# Patient Record
Sex: MALE | Race: WHITE | NOT HISPANIC OR LATINO | Employment: FULL TIME | URBAN - METROPOLITAN AREA
[De-identification: names, ages, dates, MRNs, and addresses within clinical notes are randomized per-mention and may not be internally consistent; named-entity substitution may affect disease eponyms.]

---

## 2017-09-25 ENCOUNTER — OFFICE VISIT (OUTPATIENT)
Dept: URGENT CARE | Age: 42
End: 2017-09-25
Payer: COMMERCIAL

## 2017-09-25 PROCEDURE — S9083 URGENT CARE CENTER GLOBAL: HCPCS

## 2017-09-25 PROCEDURE — G0383 LEV 4 HOSP TYPE B ED VISIT: HCPCS

## 2019-08-16 ENCOUNTER — OFFICE VISIT (OUTPATIENT)
Dept: INTERNAL MEDICINE CLINIC | Facility: CLINIC | Age: 44
End: 2019-08-16
Payer: COMMERCIAL

## 2019-08-16 VITALS
WEIGHT: 244.2 LBS | HEART RATE: 76 BPM | DIASTOLIC BLOOD PRESSURE: 70 MMHG | TEMPERATURE: 97.8 F | SYSTOLIC BLOOD PRESSURE: 118 MMHG | HEIGHT: 71 IN | OXYGEN SATURATION: 98 % | BODY MASS INDEX: 34.19 KG/M2

## 2019-08-16 DIAGNOSIS — L81.9 DISCOLORATION OF SKIN OF MULTIPLE SITES: ICD-10-CM

## 2019-08-16 DIAGNOSIS — J32.9 RECURRENT SINUSITIS: Primary | ICD-10-CM

## 2019-08-16 DIAGNOSIS — K52.9 CHRONIC DIARRHEA: ICD-10-CM

## 2019-08-16 DIAGNOSIS — Z13.6 ENCOUNTER FOR LIPID SCREENING FOR CARDIOVASCULAR DISEASE: ICD-10-CM

## 2019-08-16 DIAGNOSIS — Z13.220 ENCOUNTER FOR LIPID SCREENING FOR CARDIOVASCULAR DISEASE: ICD-10-CM

## 2019-08-16 DIAGNOSIS — M25.571 CHRONIC PAIN OF RIGHT ANKLE: ICD-10-CM

## 2019-08-16 DIAGNOSIS — G89.29 CHRONIC PAIN OF RIGHT ANKLE: ICD-10-CM

## 2019-08-16 DIAGNOSIS — J30.2 SEASONAL ALLERGIES: ICD-10-CM

## 2019-08-16 PROCEDURE — 99215 OFFICE O/P EST HI 40 MIN: CPT | Performed by: INTERNAL MEDICINE

## 2019-08-16 PROCEDURE — 3008F BODY MASS INDEX DOCD: CPT | Performed by: INTERNAL MEDICINE

## 2019-08-16 RX ORDER — LORATADINE 10 MG/1
CAPSULE, LIQUID FILLED ORAL
COMMUNITY
End: 2020-02-04

## 2019-08-16 NOTE — PATIENT INSTRUCTIONS
1  Fasting blood work  2  ENT specialist  3  GI specialist  4  Right ankle x-ray and aircast for next 2-3 weeks  5  Fasting blood work  6  Dermatology  7   Return in 6-8 weeks for follow up

## 2019-08-16 NOTE — PROGRESS NOTES
Assessment/Plan:     Diagnoses and all orders for this visit:    Recurrent sinusitis  Comments:  off/on for years with hx of seasonal allergies -> pt requests ENT eval   advised to start flonase and allegra to treat ongoing congestion  Orders:  -     Ambulatory Referral to Otolaryngology; Future  -     CBC and differential    Seasonal allergies  -     Ambulatory Referral to Otolaryngology; Future  -     Comprehensive metabolic panel; Future  -     CBC and differential  -     Comprehensive metabolic panel    Chronic pain of right ankle  Comments:  right ankle x-ray and air cast & avoid vigorous exercise using foot/ankle, if not improving over next 2-3 weeks -> ortho(foot & ankle) eval  Orders:  -     XR ankle 3+ vw right; Future    Chronic diarrhea  Comments:  IBS vs IBD vs colitis?  pt requests GI eval, provided  offered trial of bentyl, pt declined and has already tried lactose free diet  precautions advised  Orders:  -     Ambulatory referral to Gastroenterology; Future  -     Comprehensive metabolic panel; Future  -     CBC and differential  -     Lipase; Future  -     Comprehensive metabolic panel  -     Lipase    Encounter for lipid screening for cardiovascular disease  -     Lipid Panel with Direct LDL reflex; Future  -     Lipid Panel with Direct LDL reflex    Discoloration of skin of multiple sites  Comments:  derm eval, ref provided  Orders:  -     Ambulatory referral to Dermatology; Future    Other orders  -     Loratadine (CLARITIN) 10 MG CAPS; Take by mouth          BMI Counseling: Body mass index is 34 06 kg/m²  Discussed the patient's BMI with him  The BMI is above average  BMI counseling and education was provided to the patient  Nutrition recommendations include reducing portion sizes  Exercise recommendations include exercising 3-5 times per week  Spent >40 mins with patient including >50% of time counseling/coordination of care  Subjective:      Patient ID: Beryl Dhaliwal is a 40 y  o  male     HPI     Here for follow up, last seen almost 3 yrs ago  Now is a tenured  at [employer]  Has multiple questions/concerns today which we addressed at time of appt  Heaven Graham has had recurrent sinus congestion and sinus infections over recent years  He is recovering from a sinus infection now & would like to see an ENT to address  He hasn't had BW in years and gained weight since last seeing me in 2016  He is a former smoker and wants to know if he is eligible for new lung cancer screening/low dose CT scan  He has had intermittent diarrhea for years, mostly after having a meal of any food  No abd pain, N/V/abd surgery in past   No fhx IBD and has occ abd cramping and only occ blood in stool when straining to move bowels  No work up for this in past     He had ankle injury some years ago(inversion injury, never had it checked by a provider -> sprain?) and did the same injury Twice this summer  He has cont'd ankle pain some weeks now after last inversion injury  He has been using a T25 home exercise program which requires frequent jumping  Lastly, he would like to see a dermatologist for a rash he has had on his neck for years, no itching or pain but rash is spreading  Past Medical History:   Diagnosis Date    Allergic      Vitals:    08/16/19 1030   BP: 118/70   BP Location: Left arm   Patient Position: Sitting   Cuff Size: Standard   Pulse: 76   Temp: 97 8 °F (36 6 °C)   SpO2: 98%   Weight: 111 kg (244 lb 3 2 oz)   Height: 5' 11" (1 803 m)     Body mass index is 34 06 kg/m²  Current Outpatient Medications:     Loratadine (CLARITIN) 10 MG CAPS, Take by mouth, Disp: , Rfl:   No Known Allergies      Review of Systems   Constitutional: Negative for fever  HENT: Negative for congestion  Eyes: Negative for visual disturbance  Respiratory: Negative for shortness of breath  Cardiovascular: Negative for chest pain     Gastrointestinal: Positive for diarrhea  Negative for abdominal pain, nausea and vomiting  Endocrine: Negative for polyuria  Genitourinary: Negative for dysuria  Musculoskeletal: Positive for arthralgias  Skin: Positive for rash  Allergic/Immunologic: Positive for environmental allergies  Neurological: Negative for headaches  Psychiatric/Behavioral: Negative for dysphoric mood  Objective:      /70 (BP Location: Left arm, Patient Position: Sitting, Cuff Size: Standard)   Pulse 76   Temp 97 8 °F (36 6 °C)   Ht 5' 11" (1 803 m)   Wt 111 kg (244 lb 3 2 oz)   SpO2 98%   BMI 34 06 kg/m²           Physical Exam   Constitutional: He appears well-developed and well-nourished  HENT:   Head: Normocephalic and atraumatic  Right Ear: Tympanic membrane normal    Left Ear: Tympanic membrane normal    Nose: Mucosal edema present  Mouth/Throat: No posterior oropharyngeal erythema  Eyes: Pupils are equal, round, and reactive to light  Cardiovascular: Normal rate and regular rhythm  No murmur heard  Pulmonary/Chest: Effort normal and breath sounds normal  He has no wheezes  He has no rales  Abdominal: Soft  Bowel sounds are normal  There is no tenderness  Musculoskeletal: He exhibits no edema  Right ankle with medial tenderness anterior to medial malleolus  No ankle swelling & lachman's neg for joint laxity   Lymphadenopathy:     He has no cervical adenopathy  Neurological: He is alert  Skin: Rash (with patchy pau redness along neck with areas of hypopigmentation) noted  Psychiatric: He has a normal mood and affect  His behavior is normal    Vitals reviewed

## 2020-03-11 ENCOUNTER — TELEPHONE (OUTPATIENT)
Dept: INTERNAL MEDICINE CLINIC | Facility: CLINIC | Age: 45
End: 2020-03-11

## 2020-03-11 NOTE — TELEPHONE ENCOUNTER
Pt's wife is 7 months pregnant and he was wondering if he needed another TDap shot  He got one when she was pregnant with their first child, he wondered if he needed another for their second  Please advise

## 2020-03-11 NOTE — TELEPHONE ENCOUNTER
Per his records, Elizabeth Hammonds had Tdap in 1/2016, it is good for 10 yrs    No need for add'l Tdap booster for Elizabeth Hammonds at this time

## 2020-04-07 ENCOUNTER — TELEMEDICINE (OUTPATIENT)
Dept: INTERNAL MEDICINE CLINIC | Facility: CLINIC | Age: 45
End: 2020-04-07
Payer: COMMERCIAL

## 2020-04-07 DIAGNOSIS — J06.9 UPPER RESPIRATORY TRACT INFECTION, UNSPECIFIED TYPE: Primary | ICD-10-CM

## 2020-04-07 DIAGNOSIS — J30.9 ALLERGIC RHINITIS, UNSPECIFIED SEASONALITY, UNSPECIFIED TRIGGER: ICD-10-CM

## 2020-04-07 PROCEDURE — 99213 OFFICE O/P EST LOW 20 MIN: CPT | Performed by: INTERNAL MEDICINE

## 2020-04-07 RX ORDER — FLUTICASONE PROPIONATE 50 MCG
2 SPRAY, SUSPENSION (ML) NASAL DAILY
Qty: 1 BOTTLE | Refills: 1 | Status: SHIPPED | OUTPATIENT
Start: 2020-04-07 | End: 2021-06-14

## 2020-04-07 RX ORDER — FEXOFENADINE HCL 180 MG/1
180 TABLET ORAL DAILY
Qty: 90 TABLET | Refills: 1 | Status: SHIPPED | OUTPATIENT
Start: 2020-04-07 | End: 2021-06-14

## 2020-04-07 RX ORDER — AMOXICILLIN 500 MG/1
500 CAPSULE ORAL EVERY 8 HOURS SCHEDULED
Qty: 30 CAPSULE | Refills: 0 | Status: SHIPPED | OUTPATIENT
Start: 2020-04-07 | End: 2020-04-17

## 2020-04-09 DIAGNOSIS — J06.9 UPPER RESPIRATORY TRACT INFECTION, UNSPECIFIED TYPE: ICD-10-CM

## 2020-04-09 PROCEDURE — 87635 SARS-COV-2 COVID-19 AMP PRB: CPT

## 2020-04-10 LAB — SARS-COV-2 RNA SPEC QL NAA+PROBE: NOT DETECTED

## 2020-04-13 ENCOUNTER — TELEPHONE (OUTPATIENT)
Dept: INTERNAL MEDICINE CLINIC | Facility: CLINIC | Age: 45
End: 2020-04-13

## 2020-06-01 ENCOUNTER — OFFICE VISIT (OUTPATIENT)
Dept: INTERNAL MEDICINE CLINIC | Facility: CLINIC | Age: 45
End: 2020-06-01
Payer: COMMERCIAL

## 2020-06-01 VITALS
HEART RATE: 80 BPM | OXYGEN SATURATION: 97 % | BODY MASS INDEX: 39.25 KG/M2 | HEIGHT: 70 IN | WEIGHT: 274.2 LBS | SYSTOLIC BLOOD PRESSURE: 118 MMHG | RESPIRATION RATE: 18 BRPM | TEMPERATURE: 98 F | DIASTOLIC BLOOD PRESSURE: 74 MMHG

## 2020-06-01 DIAGNOSIS — M79.89 PAIN AND SWELLING OF TOE OF RIGHT FOOT: Primary | ICD-10-CM

## 2020-06-01 DIAGNOSIS — M79.674 PAIN AND SWELLING OF TOE OF RIGHT FOOT: Primary | ICD-10-CM

## 2020-06-01 DIAGNOSIS — Z13.220 ENCOUNTER FOR LIPID SCREENING FOR CARDIOVASCULAR DISEASE: ICD-10-CM

## 2020-06-01 DIAGNOSIS — Z13.6 ENCOUNTER FOR LIPID SCREENING FOR CARDIOVASCULAR DISEASE: ICD-10-CM

## 2020-06-01 DIAGNOSIS — J30.2 SEASONAL ALLERGIES: ICD-10-CM

## 2020-06-01 PROCEDURE — 1036F TOBACCO NON-USER: CPT | Performed by: INTERNAL MEDICINE

## 2020-06-01 PROCEDURE — 3008F BODY MASS INDEX DOCD: CPT | Performed by: INTERNAL MEDICINE

## 2020-06-01 PROCEDURE — 99213 OFFICE O/P EST LOW 20 MIN: CPT | Performed by: INTERNAL MEDICINE

## 2020-06-03 ENCOUNTER — HOSPITAL ENCOUNTER (OUTPATIENT)
Dept: RADIOLOGY | Facility: HOSPITAL | Age: 45
Discharge: HOME | End: 2020-06-03
Payer: COMMERCIAL

## 2020-06-03 DIAGNOSIS — M79.674 PAIN AND SWELLING OF TOE OF RIGHT FOOT: ICD-10-CM

## 2020-06-03 DIAGNOSIS — M79.89 PAIN AND SWELLING OF TOE OF RIGHT FOOT: ICD-10-CM

## 2020-06-03 PROCEDURE — 73660 X-RAY EXAM OF TOE(S): CPT

## 2020-06-05 ENCOUNTER — TELEPHONE (OUTPATIENT)
Dept: INTERNAL MEDICINE CLINIC | Facility: CLINIC | Age: 45
End: 2020-06-05

## 2020-06-05 DIAGNOSIS — S92.911A: Primary | ICD-10-CM

## 2020-08-05 ENCOUNTER — OFFICE VISIT (OUTPATIENT)
Dept: UROLOGY | Facility: MEDICAL CENTER | Age: 45
End: 2020-08-05
Payer: COMMERCIAL

## 2020-08-05 VITALS
DIASTOLIC BLOOD PRESSURE: 72 MMHG | BODY MASS INDEX: 38.36 KG/M2 | HEIGHT: 71 IN | TEMPERATURE: 97.1 F | SYSTOLIC BLOOD PRESSURE: 116 MMHG | WEIGHT: 274 LBS

## 2020-08-05 DIAGNOSIS — Z30.09 STERILIZATION CONSULT: ICD-10-CM

## 2020-08-05 DIAGNOSIS — Z30.09 STERILIZATION CONSULT: Primary | ICD-10-CM

## 2020-08-05 PROCEDURE — 99204 OFFICE O/P NEW MOD 45 MIN: CPT | Performed by: UROLOGY

## 2020-08-05 RX ORDER — ALPRAZOLAM 1 MG/1
TABLET ORAL
Qty: 2 TABLET | Refills: 0 | Status: SHIPPED | OUTPATIENT
Start: 2020-08-05 | End: 2020-11-03

## 2020-08-05 RX ORDER — ALPRAZOLAM 1 MG/1
TABLET ORAL
Qty: 2 TABLET | Refills: 0 | Status: SHIPPED | OUTPATIENT
Start: 2020-08-05 | End: 2020-08-05

## 2020-08-05 RX ORDER — HYDROCODONE BITARTRATE AND ACETAMINOPHEN 5; 325 MG/1; MG/1
TABLET ORAL
Qty: 10 TABLET | Refills: 0 | Status: SHIPPED | OUTPATIENT
Start: 2020-08-05 | End: 2020-11-03

## 2020-08-05 NOTE — TELEPHONE ENCOUNTER
Patient has an upcoming vasectomy procedure scheduled for 9/11/20 with Dr Edy Galo  Script DIRECTIONS and QUANTITY did not jive    CORRECTED SCRIPT was requeued and forwarded to the Advanced Practitioner covering the [facility] location for approval

## 2020-08-05 NOTE — PROGRESS NOTES
HISTORY:    Patient wants to schedule vasectomy  No prior  history         ASSESSMENT / PLAN:        This patient has  two children and would like to have a vasectomy  He and his wife or partner are sure they want no more children, and are aware that vasectomy is intended to be a permanent form of sterilization  He has been told and understands the following: We will order a semen analysis to check for sterility after three months, and that he must use protection during that time  No guarantee can be made of permanent sterility, and that failure of the procedure is not common, but can occur  Furthermore, spontaneous reestablishment of the flow sperm is quite rare but is a possible reason for failure of the procedure  Although it is not mandatory, if he wants to request another semen sample at any time in the future, to ensure continued sterility, he can do so, at his decision  Potential complications of the procedure include, but are not limited to:  Excessive bleeding which can cause significant swelling; infections; chronic lingering pain of the testicle; damage to the blood supply of the testicle, which might lead to testicular atrophy  The patient has told me he understands the above statements, and wishes to proceed with scheduling the vasectomy  The following portions of the patient's history were reviewed and updated as appropriate: allergies, current medications, past family history, past medical history, past social history, past surgical history and problem list     Review of Systems   All other systems reviewed and are negative  Objective:     Physical Exam   Constitutional: He is oriented to person, place, and time  He appears well-developed  No distress  HENT:   Head: Normocephalic and atraumatic  Eyes: No scleral icterus     Pulmonary/Chest: Effort normal    Genitourinary:    Genitourinary Comments:  Penis testes normal Neurological: He is alert and oriented to person, place, and time  Skin: He is not diaphoretic  No pallor  Psychiatric: His behavior is normal  Judgment and thought content normal          No results found for: PSA]  No results found for: BUN  No results found for: CREATININE  No components found for: CBC      Patient Active Problem List   Diagnosis    Seasonal allergies    Sterilization consult        Diagnoses and all orders for this visit:    Sterilization consult  -     HYDROcodone-acetaminophen (NORCO) 5-325 mg per tablet; 1-2 tab every 6 hr if needed for pain  -     ALPRAZolam (XANAX) 1 mg tablet; 1-2 hr before procedure           Patient ID: Hanane Carnes is a 39 y o  male        Current Outpatient Medications:     ALPRAZolam (XANAX) 1 mg tablet, 1-2 hr before procedure, Disp: 2 tablet, Rfl: 0    azelastine (ASTELIN) 0 1 % nasal spray, 2 sprays into each nostril 2 (two) times a day as needed for rhinitis or allergies Use in each nostril as directed (Patient not taking: Reported on 6/1/2020), Disp: 1 Bottle, Rfl: 5    fexofenadine (ALLEGRA) 180 MG tablet, Take 1 tablet (180 mg total) by mouth daily (Patient not taking: Reported on 6/1/2020), Disp: 90 tablet, Rfl: 1    fluticasone (FLONASE) 50 mcg/act nasal spray, 2 sprays into each nostril daily (Patient not taking: Reported on 6/1/2020), Disp: 1 Bottle, Rfl: 1    HYDROcodone-acetaminophen (NORCO) 5-325 mg per tablet, 1-2 tab every 6 hr if needed for pain, Disp: 10 tablet, Rfl: 0    Past Medical History:   Diagnosis Date    Allergic        Past Surgical History:   Procedure Laterality Date    KNEE ARTHROSCOPY W/ MEDIAL COLLATERAL LIGAMENT (MCL) REPAIR         Social History

## 2020-09-10 ENCOUNTER — TELEPHONE (OUTPATIENT)
Dept: UROLOGY | Facility: MEDICAL CENTER | Age: 45
End: 2020-09-10

## 2020-09-10 NOTE — TELEPHONE ENCOUNTER
This is a patient of Dr Cr Ramos in [facility]  Patient called and said his insurance company [employer] said they did not receive a referral for the procedure tomorrow  He said they said they only received it for office visit  Please call patient back at [phone]

## 2020-09-10 NOTE — TELEPHONE ENCOUNTER
Please get a new referral for this patient for his vas he doesn't need an auth but if CPT code not on referral guess it will not cover it  CPT [id number]    Thanks  Aditya Lopez

## 2020-09-10 NOTE — TELEPHONE ENCOUNTER
Called primary per primary consult and treat should be sufficient for vas  Per primary they have not heard of adding procedure code to referral but willing to try and issue a new referral with [phone] for patient  Will fax once done  Called patient and explained will call primary to issue a referral for procedure

## 2020-09-11 ENCOUNTER — PROCEDURE VISIT (OUTPATIENT)
Dept: UROLOGY | Facility: MEDICAL CENTER | Age: 45
End: 2020-09-11
Payer: COMMERCIAL

## 2020-09-11 VITALS
SYSTOLIC BLOOD PRESSURE: 118 MMHG | BODY MASS INDEX: 38.36 KG/M2 | HEIGHT: 71 IN | WEIGHT: 274 LBS | DIASTOLIC BLOOD PRESSURE: 72 MMHG | TEMPERATURE: 97.2 F

## 2020-09-11 DIAGNOSIS — Z30.2 ENCOUNTER FOR VASECTOMY: Primary | ICD-10-CM

## 2020-09-11 PROCEDURE — 88302 TISSUE EXAM BY PATHOLOGIST: CPT | Performed by: PATHOLOGY

## 2020-09-11 PROCEDURE — 55250 REMOVAL OF SPERM DUCT(S): CPT | Performed by: UROLOGY

## 2020-09-11 NOTE — LETTER
September 11, 2020     Bello Rojas DO  [address]    Patient: Rosa Hinton   YOB: 1975   Date of Visit: 9/11/2020       Dear Dr Yessi Ford:    Thank you for referring Rosa Hinton to me for evaluation  Below are my notes for this consultation  If you have questions, please do not hesitate to call me  I look forward to following your patient along with you  Sincerely,        Nancy Holguin MD        CC: No Recipients  Nancy Holguin MD  9/11/2020  8:02 AM  Sign when Signing Visit      Patient presents for vasectomy  He had a consultation recently, all questions were answered, potential complications of the procedure were discussed  Patient expressed understanding of everything we talked about, and signed informed consent document  Before the procedure today,  I asked the patient if he  had any further questions that were not answered during the vasectomy consult  He had no questions and gave informed consent for the procedure  The patient was placed in lithotomy position and the scrotum was prepped with betadine solution  I ensured he was comfortable in lithotomy position, and sterile drapes were placed  The left scrotum was palpated and vas identified  The left vas was isolated, and 8-10 cc of 1% lidocaine was instilled in the skin above the vas, in the dartos muscle and the tissue around the vas  I checked for sensation and the left side was properly anesthetized  A small incision was made over the vas, and the tissue surrounding the vas was gently dissected with hemostat to further isolate the vas  An allis clamp was placed around the vas, and it was brought up into the incision  A small incision in the tissue immediately surrounding the vas was made,and  a small loop of vas was  from the surrounding tissue with a hemostat  A small segment of vas was removed with scissors    The inner lumen of each end of the cut vas was cauterized with bovie to scar the lumen  Each end of the vas was sealed with a hemoclip, taking care not to place the clip too hard, to prevent the clipped end from sloughing off  I checked for bleeding very carefully, used gentle bovie cautery as needed  I was careful not to disturb the arterial supply to the vas or the testicle  Inspection showed no significant bleeding  The vas and surrounding tissue were gently placed back into the scrotum  The right sided vasectomy was performed in identical fashion as in the above paragraph  After removing the segment of vas, cauterizing the lumen, clipping the ends, I checked carefully for bleeding, and there was none  Each incision was closed with a running suture of 4-0 chromic  There was no significant skin bleeding  A sterile dressing was applied and the patient's underwear held the bandage snugly  He was carefully raised to a sitting position and observed to ensure no orthostasis or dizziness  He was properly recovered from the procedure  I reviewed the post-op instructions again and gave him a copy of the instructions in writing  I again emphasized the importance of using birth control until a negative semen sample was obtained at 3 months  Before that he cannot  be considered infertile  He knows his partner must use birth control until that negative sample  He also knows there are rare cases of spontaneous reversal of the vasectomy, and absolutely no guarantee can be made of lifelong infertility  He knows that he can request another semen sample at anytime in the future if he wants further reassurance, although it is not necessary  He was escorted to the waiting room, and his  knew to take him right home  He will be seen in 1-2 wks for a post-op visit  Before he left the procedure room I reviewed all the post-op instructions with him, and his said he understood them       He knows to keep the bandage on until the next day, and he can shower then  He knows to limit activities for several days and call us immediately if he has any concerns for bleeding, pain, infection, or any complication  The patient tolerated the procedure well and understood all instructions and exited the exam room in good condition

## 2020-09-18 ENCOUNTER — TELEPHONE (OUTPATIENT)
Dept: UROLOGY | Facility: MEDICAL CENTER | Age: 45
End: 2020-09-18

## 2020-09-18 NOTE — TELEPHONE ENCOUNTER
Patient seen by Dr Shelley Martin at [facility]    Patient had VAS on 9/11  VAS follow up was never set up  Cannot find an appointment with AP at [facility] or [facility] to schedule patient      Please assist     He can be reached at [phone]

## 2020-09-21 NOTE — TELEPHONE ENCOUNTER
Called and left detailed message for patient to call back to schedule an f/u   Dr Rudd Points can be double booked 9/25 at either 10 am or 3 pm

## 2020-09-25 ENCOUNTER — OFFICE VISIT (OUTPATIENT)
Dept: UROLOGY | Facility: MEDICAL CENTER | Age: 45
End: 2020-09-25

## 2020-09-25 VITALS
TEMPERATURE: 98.5 F | DIASTOLIC BLOOD PRESSURE: 74 MMHG | HEIGHT: 71 IN | SYSTOLIC BLOOD PRESSURE: 122 MMHG | WEIGHT: 272 LBS | BODY MASS INDEX: 38.08 KG/M2

## 2020-09-25 DIAGNOSIS — Z30.09 STERILIZATION CONSULT: Primary | ICD-10-CM

## 2020-09-25 PROCEDURE — 99024 POSTOP FOLLOW-UP VISIT: CPT | Performed by: UROLOGY

## 2020-11-03 ENCOUNTER — OFFICE VISIT (OUTPATIENT)
Dept: INTERNAL MEDICINE CLINIC | Facility: CLINIC | Age: 45
End: 2020-11-03
Payer: COMMERCIAL

## 2020-11-03 VITALS
RESPIRATION RATE: 18 BRPM | BODY MASS INDEX: 37.35 KG/M2 | HEIGHT: 71 IN | WEIGHT: 266.8 LBS | HEART RATE: 83 BPM | TEMPERATURE: 98.2 F | DIASTOLIC BLOOD PRESSURE: 76 MMHG | SYSTOLIC BLOOD PRESSURE: 122 MMHG | OXYGEN SATURATION: 97 %

## 2020-11-03 DIAGNOSIS — J30.2 SEASONAL ALLERGIES: ICD-10-CM

## 2020-11-03 DIAGNOSIS — Z23 FLU VACCINE NEED: ICD-10-CM

## 2020-11-03 DIAGNOSIS — Z00.00 WELLNESS EXAMINATION: Primary | ICD-10-CM

## 2020-11-03 PROCEDURE — 99396 PREV VISIT EST AGE 40-64: CPT | Performed by: INTERNAL MEDICINE

## 2020-11-03 PROCEDURE — 90471 IMMUNIZATION ADMIN: CPT

## 2020-11-03 PROCEDURE — 90686 IIV4 VACC NO PRSV 0.5 ML IM: CPT

## 2021-04-09 DIAGNOSIS — Z23 ENCOUNTER FOR IMMUNIZATION: ICD-10-CM

## 2021-06-14 ENCOUNTER — OFFICE VISIT (OUTPATIENT)
Dept: INTERNAL MEDICINE CLINIC | Facility: CLINIC | Age: 46
End: 2021-06-14
Payer: COMMERCIAL

## 2021-06-14 VITALS
BODY MASS INDEX: 38.44 KG/M2 | DIASTOLIC BLOOD PRESSURE: 75 MMHG | HEIGHT: 71 IN | HEART RATE: 78 BPM | TEMPERATURE: 98.1 F | SYSTOLIC BLOOD PRESSURE: 110 MMHG | WEIGHT: 274.6 LBS | RESPIRATION RATE: 16 BRPM | OXYGEN SATURATION: 96 %

## 2021-06-14 DIAGNOSIS — M62.830 LUMBAR PARASPINAL MUSCLE SPASM: Primary | ICD-10-CM

## 2021-06-14 DIAGNOSIS — R19.7 INTERMITTENT DIARRHEA: ICD-10-CM

## 2021-06-14 DIAGNOSIS — R21 LOCALIZED RASH: ICD-10-CM

## 2021-06-14 DIAGNOSIS — Z13.6 ENCOUNTER FOR LIPID SCREENING FOR CARDIOVASCULAR DISEASE: ICD-10-CM

## 2021-06-14 DIAGNOSIS — Z13.220 ENCOUNTER FOR LIPID SCREENING FOR CARDIOVASCULAR DISEASE: ICD-10-CM

## 2021-06-14 DIAGNOSIS — M77.02 MEDIAL EPICONDYLITIS OF ELBOW, LEFT: ICD-10-CM

## 2021-06-14 PROCEDURE — 99214 OFFICE O/P EST MOD 30 MIN: CPT | Performed by: INTERNAL MEDICINE

## 2021-06-14 RX ORDER — METHOCARBAMOL 500 MG/1
500 TABLET, FILM COATED ORAL EVERY 12 HOURS PRN
Qty: 15 TABLET | Refills: 0 | Status: SHIPPED | OUTPATIENT
Start: 2021-06-14

## 2021-06-14 NOTE — PROGRESS NOTES
Assessment/Plan:     Diagnoses and all orders for this visit:    Lumbar paraspinal muscle spasm  Comments:  trial of hydration, rest and robaxin  d/w patient AE of med including drowsiness  f/u if not improved for imaging +/- PT  Orders:  -     Comprehensive metabolic panel; Future  -     Comprehensive metabolic panel  -     methocarbamol (ROBAXIN) 500 mg tablet; Take 1 tablet (500 mg total) by mouth every 12 (twelve) hours as needed for muscle spasms    Medial epicondylitis of elbow, left  Comments:  elbow brace(for tennis/golfer's elbow) daily for 6-8 weeks  advised to call if not improving  Orders:  -     Comprehensive metabolic panel; Future  -     Comprehensive metabolic panel  -     Elbow Rom Brace    Localized rash  Comments:  improved with emollients and as seasons changed(as does every year)  c/w emollients & t/c derm eval  Orders:  -     Comprehensive metabolic panel; Future  -     Comprehensive metabolic panel    Intermittent diarrhea  Comments:  advised on trial of FODMAP diet(handout provided to patient)  emphasized importance of seeing GI for eval/proper testing    Encounter for lipid screening for cardiovascular disease  -     Lipid Panel with Direct LDL reflex; Future  -     Lipid Panel with Direct LDL reflex    Other orders  -     Cancel: XR elbow 2 vw left; Future      BMI Counseling: Body mass index is 38 3 kg/m²  The BMI is above normal  Exercise recommendations include exercising 3-5 times per week  Subjective:      Patient ID: Vera Chacon is a 39 y o  male  HPI    Here for follow up, [facility] is overall stable but has a few concerns  He did not complete his BW yet  Every winter, he develops a red rash around his eyes that is not painful or itchy  He has no eye involvement or vision changes  Rash improved when he used vitamin A & d ointment but usually goes away after January every year  Also c/o medlal left elbow pain recently  No fall/injury or elbow swelling    Good ROM of left arm/elbow  Jamshid Puri was building a fence around his vegetable garden and may have pulled his back a couple of weeks ago  He did not fall but is still having low back spasms since then  He has intermittent diarrhea related to certain foods  He does not have N/V/abd pain or change in appetite  His wife wants him to see a GI doctor for this which is reasonable  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  He stopped exercising for sometime but has since resumed lately including running  ROS Otherwise negative, no other complaints  Past Medical History:   Diagnosis Date    Allergic      Vitals:    06/14/21 0924 06/14/21 0952   BP: 122/78 110/75   BP Location:  Left arm   Patient Position:  Sitting   Cuff Size:  Standard   Pulse: 78    Resp: 16    Temp: 98 1 °F (36 7 °C)    SpO2: 96%    Weight: 125 kg (274 lb 9 6 oz)    Height: 5' 11" (1 803 m)      Body mass index is 38 3 kg/m²  Current Outpatient Medications:     methocarbamol (ROBAXIN) 500 mg tablet, Take 1 tablet (500 mg total) by mouth every 12 (twelve) hours as needed for muscle spasms, Disp: 15 tablet, Rfl: 0  No Known Allergies      Review of Systems   Constitutional: Negative for fever  HENT: Negative for congestion  Eyes: Negative for visual disturbance  Respiratory: Negative for shortness of breath  Cardiovascular: Negative for chest pain  Gastrointestinal: Negative for abdominal pain  Endocrine: Negative for polyuria  Genitourinary: Negative for difficulty urinating  Musculoskeletal: Positive for arthralgias, back pain and myalgias  Skin: Positive for rash (but since resolved)  Allergic/Immunologic: Negative for immunocompromised state  Neurological: Negative for weakness  Psychiatric/Behavioral: Negative for dysphoric mood           Objective:      /75 (BP Location: Left arm, Patient Position: Sitting, Cuff Size: Standard)   Pulse 78   Temp 98 1 °F (36 7 °C)   Resp 16   Ht 5' 11" (1 803 m)   Wt 125 kg (274 lb 9 6 oz)   SpO2 96%   BMI 38 30 kg/m²          Physical Exam  Vitals reviewed  Constitutional:       Appearance: Normal appearance  He is obese  HENT:      Head: Normocephalic and atraumatic  Right Ear: Tympanic membrane normal       Left Ear: Tympanic membrane normal    Cardiovascular:      Rate and Rhythm: Normal rate and regular rhythm  Heart sounds: No murmur heard  Pulmonary:      Effort: Pulmonary effort is normal       Breath sounds: No wheezing or rales  Abdominal:      General: Bowel sounds are normal       Palpations: Abdomen is soft  Tenderness: There is no abdominal tenderness  Musculoskeletal:      Left elbow: No effusion  Normal range of motion  No tenderness  Back:       Right lower leg: No edema  Left lower leg: No edema  Comments: Pain with resisted supination of left forearm along medial epicondyle  No spinal or paraspinal tenderness b/l   Skin:     Findings: No rash (no rash around eyes at this time)  Neurological:      Mental Status: He is alert     Psychiatric:         Mood and Affect: Mood normal          Behavior: Behavior normal

## 2022-06-08 ENCOUNTER — TELEPHONE (OUTPATIENT)
Dept: OTHER | Facility: OTHER | Age: 47
End: 2022-06-08

## 2022-06-08 DIAGNOSIS — Z30.2 ENCOUNTER FOR VASECTOMY: Primary | ICD-10-CM

## 2022-06-08 NOTE — TELEPHONE ENCOUNTER
Patient calling in stating that he was suppose to have a semen analysis post vasectomy awhile back and he did not make it to the lab to get it done  He would like to know if he could get another order for the semen analysis  Please follow up with patient once his order is placed so he can go to the lab to have it completed

## 2022-06-13 ENCOUNTER — OFFICE VISIT (OUTPATIENT)
Dept: LAB | Facility: HOSPITAL | Age: 47
End: 2022-06-13
Attending: UROLOGY
Payer: COMMERCIAL

## 2022-06-13 DIAGNOSIS — Z30.2 STERILIZATION: Primary | ICD-10-CM

## 2022-06-13 LAB
DEPRECATED CD4 CELLS/CD8 CELLS BLD: 1 ML
SPERM MOTILE SMN QL MICRO: NORMAL

## 2022-06-13 PROCEDURE — 89321 SEMEN ANAL SPERM DETECTION: CPT

## 2022-11-15 ENCOUNTER — OFFICE VISIT (OUTPATIENT)
Dept: INTERNAL MEDICINE CLINIC | Facility: CLINIC | Age: 47
End: 2022-11-15

## 2022-11-15 VITALS
RESPIRATION RATE: 16 BRPM | WEIGHT: 283 LBS | TEMPERATURE: 97 F | SYSTOLIC BLOOD PRESSURE: 120 MMHG | HEART RATE: 73 BPM | OXYGEN SATURATION: 98 % | HEIGHT: 71 IN | BODY MASS INDEX: 39.62 KG/M2 | DIASTOLIC BLOOD PRESSURE: 78 MMHG

## 2022-11-15 DIAGNOSIS — L85.3 DRY SKIN DERMATITIS: ICD-10-CM

## 2022-11-15 DIAGNOSIS — Z11.59 NEED FOR HEPATITIS C SCREENING TEST: ICD-10-CM

## 2022-11-15 DIAGNOSIS — Z00.00 WELLNESS EXAMINATION: Primary | ICD-10-CM

## 2022-11-15 DIAGNOSIS — Z23 FLU VACCINE NEED: ICD-10-CM

## 2022-11-15 DIAGNOSIS — E66.9 CLASS 2 OBESITY WITHOUT SERIOUS COMORBIDITY WITH BODY MASS INDEX (BMI) OF 39.0 TO 39.9 IN ADULT, UNSPECIFIED OBESITY TYPE: ICD-10-CM

## 2022-11-15 DIAGNOSIS — Z12.11 SCREEN FOR COLON CANCER: ICD-10-CM

## 2022-11-15 DIAGNOSIS — Z13.220 ENCOUNTER FOR LIPID SCREENING FOR CARDIOVASCULAR DISEASE: ICD-10-CM

## 2022-11-15 DIAGNOSIS — Z13.6 ENCOUNTER FOR LIPID SCREENING FOR CARDIOVASCULAR DISEASE: ICD-10-CM

## 2022-11-15 NOTE — PROGRESS NOTES
Name: Mary Montelongo      : 1975      MRN: [id number]  Encounter Provider: Andreas Hudson DO  Encounter Date: 11/15/2022   Encounter department: [place] [address]     1  Wellness examination    2  Dry skin dermatitis  Comments:  eucerin, cerave or aquaphor daily for dryness    3  Class 2 obesity without serious comorbidity with body mass index (BMI) of 39 0 to 39 9 in adult, unspecified obesity type  Comments:  diet/exercise counseling discussed  information provided on saxenda and wegovy for his review  Orders:  -     Comprehensive metabolic panel; Future  -     Comprehensive metabolic panel    4  Encounter for lipid screening for cardiovascular disease  -     Lipid Panel with Direct LDL reflex; Future  -     Lipid Panel with Direct LDL reflex    5  Need for hepatitis C screening test  -     Hepatitis C antibody; Future  -     Hepatitis C antibody    6  Flu vaccine need  -     influenza vaccine, quadrivalent, recombinant, PF, 0 5 mL, for patients 18 yr+ (FLUBLOK)    7  Screen for colon cancer  Comments:  no symptoms, average risk  Orders:  -     Ambulatory referral for colonoscopy; Future      BMI Counseling: Body mass index is 39 47 kg/m²  The BMI is above normal  Nutrition recommendations include consuming healthier snacks  Exercise recommendations include exercising 3-5 times per week  Rationale for BMI follow-up plan is due to patient being overweight or obese  Depression Screening and Follow-up Plan: Patient was screened for depression during today's encounter  They screened negative with a PHQ-2 score of 0  Subjective      HPI     Here for physical, Ricardo Chau takes no medications on  Regular basis  He requests flu vaccine today  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[employer]  He has limited time out of work and taking care of family to exercise    He recently gained weight as he had a knee problem and now has right foot pain for which he is seeing podiatry for  He has not had BW in some years and never had colonoscopy in the past   ROS Otherwise negative, no other complaints  Review of Systems   Constitutional: Negative for fever  HENT: Negative for congestion  Eyes: Negative for visual disturbance  Respiratory: Negative for shortness of breath  Cardiovascular: Negative for chest pain  Gastrointestinal: Negative for abdominal pain  Endocrine: Negative for polyuria  Genitourinary: Negative for difficulty urinating  Musculoskeletal: Positive for arthralgias  Skin: Negative for rash  Allergic/Immunologic: Negative for immunocompromised state  Neurological: Negative for dizziness  Psychiatric/Behavioral: Negative for dysphoric mood  Current Outpatient Medications on File Prior to Visit   Medication Sig   • [DISCONTINUED] methocarbamol (ROBAXIN) 500 mg tablet Take 1 tablet (500 mg total) by mouth every 12 (twelve) hours as needed for muscle spasms       Objective     /78 (BP Location: Left arm, Patient Position: Sitting, Cuff Size: Large)   Pulse 73   Temp (!) 97 °F (36 1 °C) (Tympanic)   Resp 16   Ht 5' 11" (1 803 m)   Wt 128 kg (283 lb)   SpO2 98%   BMI 39 47 kg/m²     Physical Exam  Vitals reviewed  Constitutional:       Appearance: Normal appearance  He is obese  Interventions: Face mask in place  HENT:      Head: Normocephalic and atraumatic  Right Ear: Tympanic membrane normal       Left Ear: Tympanic membrane normal    Eyes:      Conjunctiva/sclera: Conjunctivae normal    Cardiovascular:      Rate and Rhythm: Normal rate and regular rhythm  Heart sounds: No murmur heard  No gallop  Pulmonary:      Effort: Pulmonary effort is normal       Breath sounds: No wheezing or rales  Abdominal:      General: Bowel sounds are normal       Palpations: Abdomen is soft  Tenderness: There is no abdominal tenderness     Musculoskeletal:      Right lower leg: Edema (trace since having right knee injury but improving/resolving) present  Left lower leg: No edema  Skin:     General: Skin is dry  Comments: Dry skin on lower legs   Neurological:      Mental Status: He is alert  Mental status is at baseline     Psychiatric:         Mood and Affect: Mood normal          Behavior: Behavior normal        Bello Rojas DO

## 2022-11-15 NOTE — PATIENT INSTRUCTIONS
Flu vaccine today  Fasting blood work  Colonoscopy  Eucerin, aquaphor or Cerave    Liraglutide (By injection)   Liraglutide (xef-d-CHMU-tide)  Treats type 2 diabetes and helps with weight loss in certain patients  Also reduces the risk of heart attacks and strokes in patients with type 2 diabetes and heart or blood vessel disease  Brand Name(s): Saxenda, Victoza   There may be other brand names for this medicine  When This Medicine Should Not Be Used: This medicine is not right for everyone  Do not use it if you had an allergic reaction to liraglutide, or if you have type 1 diabetes or multiple endocrine neoplasia syndrome type 2 (MEN 2) or if you or anyone in your family had medullary thyroid cancer  Tell your doctor if you are pregnant or have become pregnant while you are using this medicine  How to Use This Medicine:   Injectable  Your doctor will prescribe your exact dose and tell you how often it should be given  This medicine is given as a shot under your skin  It is usually given in your stomach, thighs, or upper arms  If you use insulin in addition to Victoza®, do not mix them into the same syringe  You may give the shots in the same area (including your stomach), but do not give the shots right next to each other  You may be taught how to give your medicine at home  Make sure you understand all instructions before giving yourself an injection  Do not use more medicine or use it more often than your doctor tells you to  Check the liquid in the pen  It should be clear and colorless  Do not use it if it is cloudy, discolored, or has particles in it  You will be shown the body areas where this shot can be given  Use a different body area each time you give yourself a shot  Keep track of where you give each shot to make sure you rotate body areas  Use a new needle and syringe each time you inject your medicine  Never share medicine pens with others under any circumstances   Sharing needles or pens can result in transmission of infection  Drink extra fluids so you will urinate more often and help prevent kidney problems  This medicine should come with a Medication Guide  Ask your pharmacist for a copy if you do not have one  Missed dose: If you miss a dose of this medicine, skip the missed dose and go back to your regular dosing schedule  Never take extra medicine to make up for a missed dose  If you miss a dose for 3 days or more, call your doctor to talk about how to restart your treatment  Store your new, unused medicine pen in its original carton in the refrigerator  Protect it from light  Do not freeze this medicine or use it if it has been frozen  You may store the opened medicine pen in the refrigerator or at room temperature for 30 days  Throw away any unused medicine after 30 days, even if it still has medicine in it  Always remove the needle from the pen before you store it  Throw away used needles in a hard, closed container that the needles cannot poke through  Keep this container away from children and pets  Drugs and Foods to Avoid:   Ask your doctor or pharmacist before using any other medicine, including over-the-counter medicines, vitamins, and herbal products  Do not use Saxenda® together with insulin  Some medicines can affect how Claudia Gibbsf  works  Tell your doctor if you are using insulin or other diabetes medicine (including ? ?glibenclamide, glimepiride, glipizide) or any other oral medicine  Warnings While Using This Medicine:   Tell your doctor if you are breastfeeding, or if you have kidney disease, liver disease, digestion problems (including gastroparesis), gallbladder disease, or a history of pancreas problems, depression, or angioedema (swelling of the arms, face, hands, mouth, or throat)  Do not use Saxenda® if you are also using Victoza®  They contain the same medicine    This medicine may cause the following problems:   Increased risk for thyroid tumors  Pancreatitis (swelling of the pancreas)  Low blood sugar (when used together with insulin or other diabetes medicine)  Kidney problems  Gallbladder problems, including gallstones  Thoughts of hurting yourself Eloise Jones)  Your doctor will do lab tests at regular visits to check on the effects of this medicine  Keep all appointments  Keep all medicine out of the reach of children  Never share your medicine with anyone  Possible Side Effects While Using This Medicine:   Call your doctor right away if you notice any of these side effects: Allergic reaction: Itching or hives, swelling in your face or hands, swelling or tingling in your mouth or throat, chest tightness, trouble breathing  Change in how much or how often you urinate, painful or burning urination  Feeling sad or depressed, thoughts of suicide, unusual changes in mood or behavior  Shaking, trembling, sweating, fast or pounding heartbeat, fainting, hunger, confusion  Sudden and severe stomach pain, nausea, vomiting, fever, lightheadedness, yellow skin or eyes  Trouble breathing or swallowing, a lump in your neck, hoarseness when speaking  If you notice these less serious side effects, talk with your doctor:   Decreased appetite  Diarrhea, constipation, stomach upset  Dizziness  Headache  Redness, itching, swelling, or any changes in your skin where the shot was given  If you notice other side effects that you think are caused by this medicine, tell your doctor  Call your doctor for medical advice about side effects  You may report side effects to FDA at 0-941-FDA-0722    © Copyright [employer] 2022 Information is for End User's use only and may not be sold, redistributed or otherwise used for commercial purposes  The above information is an  only  It is not intended as medical advice for individual conditions or treatments   Talk to your doctor, nurse or pharmacist before following any medical regimen to see if it is safe and effective for you       Semaglutide (By injection)   Semaglutide (wzv-b-LVTE-tide)  Treats type 2 diabetes  Lowers the risk of heart attack, stroke, or death in patients with type 2 diabetes and heart or blood vessel disease  Also used to help lose weight and keep the weight off in patients with obesity caused by certain conditions  Brand Name(s): Ozempic 0 25 MG or 0 5 MG Doses, Ozempic 1 MG Doses, Wegovy   There may be other brand names for this medicine  When This Medicine Should Not Be Used: This medicine is not right for everyone  Do not use it if you had an allergic reaction to semaglutide, or if you have multiple endocrine neoplasia syndrome type 2 (MEN 2) or if you or anyone in your family has had medullary thyroid cancer  How to Use This Medicine:   Injectable  Your doctor will prescribe your exact dose and tell you how often it should be given  This medicine is given as a shot under your skin  It is given into your stomach, thigh, or upper arm  You may be taught how to give your medicine at home  Make sure you understand all instructions before giving yourself an injection  Do not use more medicine or use it more often than your doctor tells you to  If you use insulin in addition to this medicine, do not mix them into the same syringe  You may give the shots in the same area (including your stomach), but do not give the shots right next to each other  Check the liquid in the pen  It should be clear and colorless  Do not use it if it is cloudy, discolored, or has particles in it  You will be shown the body areas where this shot can be given  Use a different body area each time you give yourself a shot  Keep track of where you give each shot to make sure you rotate body areas  Use a new needle and syringe each time you inject your medicine  Never share medicine pens with others under any circumstances  Sharing needles or pens can result in transmission of infection    This medicine should come with a Medication Guide  Ask your pharmacist for a copy if you do not have one  Missed dose:   Ozempic®: If you miss a dose of this medicine, use it as soon as possible within 5 days after the missed dose  If you miss a dose for more than 5 days, skip the missed dose and go back to your regular dosing schedule  Wegovy™: If you miss a dose, and the next scheduled dose is more than 2 days away, use it as soon as possible  If you miss a dos, and the next scheduled dose is less than 2 days away, skip the missed dose and go back to your regular dosing schedule  If you miss a dose of this medicine for more than 2 weeks, use it on the next scheduled dose  Ask your doctor about how to restart your treatment  Store your new, unused medicine pen in its original carton in the refrigerator  Do not freeze  You may store the opened Ozempic® pen in the refrigerator or at room temperature for 56 days or the opened [facility] pen in the refrigerator or at room temperature for 28 days  Throw away the pen after you use it for 56 days for Ozempic® or 28 days for [facility], even if it still has medicine in it  Drugs and Foods to Avoid:   Ask your doctor or pharmacist before using any other medicine, including over-the-counter medicines, vitamins, and herbal products  Some medicines may affect how semaglutide works  Tell your doctor if you are using other diabetes medicine (including glimepiride, glipizide, glyburide, or insulin) or any oral medicine  Warnings While Using This Medicine:   Tell your doctor if you are pregnant or planning to become pregnant  Do not use this medicine for at least 2 months before you plan to become pregnant  Tell your doctor if you are breastfeeding, or if you have kidney disease, pancreas problems, diabetes, digestion problems, or a history of diabetic retinopathy or depression    This medicine may cause the following problems:  Increased risk of thyroid tumor  Pancreatitis (swelling of the pancreas)  Gallbladder problems  Low blood sugar (when used with other diabetes medicine)  Kidney problems  Eye or vision problems, including diabetic retinopathy  Increased heart rate  Increased risk for depression or thoughts of suicide  Your doctor will do lab tests at regular visits to check on the effects of this medicine  Keep all appointments  Keep all medicine out of the reach of children  Never share your medicine with anyone  Possible Side Effects While Using This Medicine:   Call your doctor right away if you notice any of these side effects: Allergic reaction: Itching or hives, swelling in your face or hands, swelling or tingling in your mouth or throat, chest tightness, trouble breathing  Blurred vision or any other change in vision  Change in how much or how often you urinate, lower back or side pain, blood in your urine  Shaking, trembling, sweating, fast or pounding heartbeat, lightheadedness, hunger, confusion  Sudden and severe stomach pain, nausea, vomiting, fever, yellow eyes or skin  Unusual moods or behaviors, depression, thoughts of hurting yourself or others  If you notice these less serious side effects, talk with your doctor:   Constipation, diarrhea, passing gas, stomach upset or bloating  Headache, dizziness  Redness, itching, bump, swelling, or any changes in your skin where the shot was given  Tiredness  If you notice other side effects that you think are caused by this medicine, tell your doctor  Call your doctor for medical advice about side effects  You may report side effects to FDA at 0-432-FDA-0096    © Copyright [employer] 2022 Information is for End User's use only and may not be sold, redistributed or otherwise used for commercial purposes  The above information is an  only  It is not intended as medical advice for individual conditions or treatments   Talk to your doctor, nurse or pharmacist before following any medical regimen to see if it is safe and effective for you

## 2023-08-02 ENCOUNTER — TELEMEDICINE (OUTPATIENT)
Dept: INTERNAL MEDICINE CLINIC | Facility: CLINIC | Age: 48
End: 2023-08-02
Payer: COMMERCIAL

## 2023-08-02 ENCOUNTER — TELEPHONE (OUTPATIENT)
Dept: INTERNAL MEDICINE CLINIC | Facility: CLINIC | Age: 48
End: 2023-08-02

## 2023-08-02 DIAGNOSIS — J06.9 UPPER RESPIRATORY TRACT INFECTION, UNSPECIFIED TYPE: Primary | ICD-10-CM

## 2023-08-02 PROCEDURE — 99213 OFFICE O/P EST LOW 20 MIN: CPT | Performed by: INTERNAL MEDICINE

## 2023-08-02 RX ORDER — DOXYCYCLINE HYCLATE 100 MG/1
100 CAPSULE ORAL EVERY 12 HOURS SCHEDULED
Qty: 14 CAPSULE | Refills: 0 | Status: SHIPPED | OUTPATIENT
Start: 2023-08-02 | End: 2023-08-09

## 2023-08-02 NOTE — PROGRESS NOTES
Virtual Regular Visit    Verification of patient location:    Patient is located at Home in the following state in which I hold an active license [place]      Assessment/Plan:    Problem List Items Addressed This Visit    None  Visit Diagnoses     Upper respiratory tract infection, unspecified type    -  Primary    Relevant Medications    doxycycline hyclate (VIBRAMYCIN) 100 mg capsule        Disposition:    -URI -> will treat with doxycycline for 7 days. Advised on AE of abx(take with food and sunblock now that it is summer). 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 Reason for visit is   Chief Complaint   Patient presents with   • Virtual Regular Visit        Encounter provider Luis Benavides DO    Provider located at [address],[facility] [address]  [phone]      Recent Visits  No visits were found meeting these conditions. Showing recent visits within past 7 days and meeting all other requirements  Today's Visits  Date Type Provider Dept   08/02/23 Telemedicine Stefani Menchaca [facility] Med   08/02/23 Telephone Stefani Menchaca [facility]   Showing today's visits and meeting all other requirements  Future Appointments  No visits were found meeting these conditions. Showing future appointments within next 150 days and meeting all other requirements       The patient was identified by name and date of birth. Peter Aceves was informed that this is a telemedicine visit and that the visit is being conducted through the [employer]. He agrees to proceed. .  My office door was closed. No one else was in the room. He acknowledged consent and understanding of privacy and security of the video platform. The patient has agreed to participate and understands they can discontinue the visit at any time.     Patient is aware this is a billable service. Subjective  Dorina Oglesby is a 50 y.o. male for follow up, virtual(15) visit due to ongoing COVID-19 pandemic. HPI     Eunice Antony has had 3 weeks of URI symptoms. He had COVID-19 infection back in May and recovered fully from that. This episode started while on vacation. He had no Fever, chills, diarrhea or SOB. He is having a productive cough, some congestion and feeling unwell. He started taking OTC remedies about 1 week ago with limited benefit. He has no other questions or concerns today and ROS is otherwise negative. Past Medical History:   Diagnosis Date   • Allergic        Past Surgical History:   Procedure Laterality Date   • KNEE ARTHROSCOPY W/ MEDIAL COLLATERAL LIGAMENT (MCL) REPAIR         Current Outpatient Medications   Medication Sig Dispense Refill   • doxycycline hyclate (VIBRAMYCIN) 100 mg capsule Take 1 capsule (100 mg total) by mouth every 12 (twelve) hours for 7 days 14 capsule 0     No current facility-administered medications for this visit. No Known Allergies    Review of Systems   Constitutional: Positive for fatigue. Negative for chills and fever. HENT: Positive for congestion. Negative for sinus pressure and sore throat. Respiratory: Positive for cough. Negative for shortness of breath and wheezing. Cardiovascular: Negative for chest pain. Gastrointestinal: Negative for diarrhea. Allergic/Immunologic: Negative for immunocompromised state. Psychiatric/Behavioral: Negative for dysphoric mood. Video Exam    There were no vitals filed for this visit. Physical Exam  Constitutional:       General: He is not in acute distress. Appearance: He is ill-appearing. Eyes:      Conjunctiva/sclera: Conjunctivae normal.   Pulmonary:      Effort: Pulmonary effort is normal. No respiratory distress. Neurological:      Mental Status: He is alert.    Psychiatric:         Mood and Affect: Mood normal.         Behavior: Behavior normal.          Visit Time  Total Visit Duration: Spent 10 mins completing this visit including time counseling with the patient, reviewing the patient's records and documenting in the encounter.

## 2023-08-02 NOTE — Clinical Note
Please cancel Myron's august 10th appointment  He will call back to make an appt in November  thanks

## 2023-08-02 NOTE — TELEPHONE ENCOUNTER
Pt has aconjestion with lots of mucous for about 3 weeks now. Will get the covid test and call me back in the meanwhile I schedule him for 8/10.  If possible he would like medication

## 2023-09-05 ENCOUNTER — OFFICE VISIT (OUTPATIENT)
Dept: INTERNAL MEDICINE CLINIC | Facility: CLINIC | Age: 48
End: 2023-09-05
Payer: COMMERCIAL

## 2023-09-05 VITALS
HEART RATE: 98 BPM | DIASTOLIC BLOOD PRESSURE: 80 MMHG | OXYGEN SATURATION: 98 % | BODY MASS INDEX: 36.82 KG/M2 | TEMPERATURE: 98.3 F | SYSTOLIC BLOOD PRESSURE: 128 MMHG | WEIGHT: 264 LBS

## 2023-09-05 DIAGNOSIS — R10.9 ACUTE RIGHT FLANK PAIN: Primary | ICD-10-CM

## 2023-09-05 DIAGNOSIS — Z12.11 SCREEN FOR COLON CANCER: ICD-10-CM

## 2023-09-05 DIAGNOSIS — R10.9 RIGHT SIDED ABDOMINAL PAIN: ICD-10-CM

## 2023-09-05 DIAGNOSIS — R11.2 NAUSEA AND VOMITING, UNSPECIFIED VOMITING TYPE: ICD-10-CM

## 2023-09-05 DIAGNOSIS — Z13.220 ENCOUNTER FOR LIPID SCREENING FOR CARDIOVASCULAR DISEASE: ICD-10-CM

## 2023-09-05 DIAGNOSIS — Z13.6 ENCOUNTER FOR LIPID SCREENING FOR CARDIOVASCULAR DISEASE: ICD-10-CM

## 2023-09-05 PROCEDURE — 99213 OFFICE O/P EST LOW 20 MIN: CPT | Performed by: INTERNAL MEDICINE

## 2023-09-05 NOTE — PROGRESS NOTES
Name: Lance Briggs      : 1975      MRN: [id number]  Encounter Provider: Stephanie Hicks DO  Encounter Date: 2023   Encounter department: [address] [place] [address]     1. Acute right flank pain  Comments:  diff dx includes cholecystitis, kidney stone, pancreatitis. BW/urine test and US of abdomen as Epic stated CT A/P will likely not be approved(see below)  Orders:  -     UA w Reflex to Microscopic w Reflex to Culture -Lab Collect; Future; Expected date: 2023  -     Comprehensive metabolic panel; Future  -     CBC and differential; Future  -     Lipase; Future  -     Comprehensive metabolic panel  -     CBC and differential  -     Lipase  -     US abdomen complete; Future; Expected date: 2023    2. Nausea and vomiting, unspecified vomiting type  Comments:  no symptoms now and they appear colicky in nature. c/w plan/work up as above. Orders:  -     UA w Reflex to Microscopic w Reflex to Culture -Lab Collect; Future; Expected date: 2023  -     Comprehensive metabolic panel; Future  -     CBC and differential; Future  -     Lipase; Future  -     Comprehensive metabolic panel  -     CBC and differential  -     Lipase  -     US abdomen complete; Future; Expected date: 2023    3. Right sided abdominal pain  -     US abdomen complete; Future; Expected date: 2023    4. Screen for colon cancer  Comments:  due for this, ref provided to GI  Orders:  -     Ambulatory Referral to Gastroenterology; Future    5. Encounter for lipid screening for cardiovascular disease  -     Lipid Panel with Direct LDL reflex; Future  -     Lipid Panel with Direct LDL reflex      BMI Counseling: Body mass index is 36.82 kg/m². The BMI is above normal. Exercise recommendations include exercising 3-5 times per week. Rationale for BMI follow-up plan is due to patient being overweight or obese.        Upon attempting to order CT A/P for work up, rec'd a message in Epic that insurance is unlikely to approve CT without an US being completed first.  US ordered and for ASAP as above. Subjective      HPI     Here with c/o 2 episodes of right flank and side pain that was severe and followed with N/V. No fall/injury or history of previous back problems. No urinary symptoms and moving bowels normally. He has lost 20 lbs since fall 2022 with changing diet and exercising regularly. The pain was severe and followed by N/V and then pain went away in less than an hour. It occurred again a few days later, last episode was 5 days ago. No back pain in-between episodes. The pain was 2-3 hours after dinner and after lunch. Never had similar pain before. ROS otherwise negative, no other complaints. Review of Systems   Constitutional: Negative for fever. HENT: Negative for congestion. Eyes: Negative for visual disturbance. Respiratory: Negative for shortness of breath. Cardiovascular: Negative for chest pain. Gastrointestinal: Positive for nausea and vomiting. Negative for abdominal pain and diarrhea. Endocrine: Negative for polyuria. Genitourinary: Positive for flank pain. Negative for difficulty urinating, dysuria, frequency, hematuria and urgency. Musculoskeletal: Positive for back pain. Skin: Negative for rash. Allergic/Immunologic: Positive for environmental allergies. Neurological: Negative for dizziness. Psychiatric/Behavioral: Negative for dysphoric mood. No current outpatient medications on file prior to visit. Objective     /80 (BP Location: Left arm, Patient Position: Sitting, Cuff Size: Standard)   Pulse 98   Temp 98.3 °F (36.8 °C)   Wt 120 kg (264 lb)   SpO2 98%   BMI 36.82 kg/m²     Physical Exam  Vitals reviewed. Constitutional:       General: He is not in acute distress. Appearance: He is obese. HENT:      Head: Normocephalic and atraumatic.    Eyes:      Conjunctiva/sclera: Conjunctivae normal. Cardiovascular:      Rate and Rhythm: Normal rate and regular rhythm. Heart sounds: No murmur heard. Pulmonary:      Effort: Pulmonary effort is normal.      Breath sounds: No wheezing or rales. Abdominal:      General: Bowel sounds are normal.      Palpations: Abdomen is soft. Tenderness: There is no abdominal tenderness. There is no right CVA tenderness, left CVA tenderness, guarding or rebound. Negative signs include Kelley's sign. Musculoskeletal:      Right lower leg: No edema. Left lower leg: No edema. Neurological:      Mental Status: He is alert. Mental status is at baseline.    Psychiatric:         Mood and Affect: Mood normal.         Behavior: Behavior normal.       Bello Rojas,

## 2023-09-07 ENCOUNTER — HOSPITAL ENCOUNTER (OUTPATIENT)
Dept: ULTRASOUND IMAGING | Facility: HOSPITAL | Age: 48
End: 2023-09-07
Payer: COMMERCIAL

## 2023-09-07 DIAGNOSIS — R10.9 RIGHT SIDED ABDOMINAL PAIN: ICD-10-CM

## 2023-09-07 DIAGNOSIS — R11.2 NAUSEA AND VOMITING, UNSPECIFIED VOMITING TYPE: ICD-10-CM

## 2023-09-07 DIAGNOSIS — R10.9 ACUTE RIGHT FLANK PAIN: ICD-10-CM

## 2023-09-07 PROCEDURE — 76700 US EXAM ABDOM COMPLETE: CPT

## 2023-09-12 ENCOUNTER — TELEPHONE (OUTPATIENT)
Dept: INTERNAL MEDICINE CLINIC | Facility: CLINIC | Age: 48
End: 2023-09-12

## 2023-09-20 LAB
ALBUMIN SERPL-MCNC: 4.5 G/DL (ref 4.1–5.1)
ALBUMIN/GLOB SERPL: 1.8 {RATIO} (ref 1.2–2.2)
ALP SERPL-CCNC: 96 IU/L (ref 44–121)
ALT SERPL-CCNC: 25 IU/L (ref 0–44)
AST SERPL-CCNC: 18 IU/L (ref 0–40)
BASOPHILS # BLD AUTO: 0.1 X10E3/UL (ref 0–0.2)
BASOPHILS NFR BLD AUTO: 1 %
BILIRUB SERPL-MCNC: 0.7 MG/DL (ref 0–1.2)
BUN SERPL-MCNC: 8 MG/DL (ref 6–24)
BUN/CREAT SERPL: 9 (ref 9–20)
CALCIUM SERPL-MCNC: 9.2 MG/DL (ref 8.7–10.2)
CHLORIDE SERPL-SCNC: 105 MMOL/L (ref 96–106)
CHOLEST SERPL-MCNC: 160 MG/DL (ref 100–199)
CO2 SERPL-SCNC: 24 MMOL/L (ref 20–29)
CREAT SERPL-MCNC: 0.91 MG/DL (ref 0.76–1.27)
EGFR: 104 ML/MIN/1.73
EOSINOPHIL # BLD AUTO: 0.3 X10E3/UL (ref 0–0.4)
EOSINOPHIL NFR BLD AUTO: 5 %
ERYTHROCYTE [DISTWIDTH] IN BLOOD BY AUTOMATED COUNT: 13 % (ref 11.6–15.4)
GLOBULIN SER-MCNC: 2.5 G/DL (ref 1.5–4.5)
GLUCOSE SERPL-MCNC: 89 MG/DL (ref 70–99)
HCT VFR BLD AUTO: 45.4 % (ref 37.5–51)
HCV AB S/CO SERPL IA: NON REACTIVE
HDLC SERPL-MCNC: 41 MG/DL
HGB BLD-MCNC: 15.3 G/DL (ref 13–17.7)
IMM GRANULOCYTES # BLD: 0 X10E3/UL (ref 0–0.1)
IMM GRANULOCYTES NFR BLD: 0 %
LDLC SERPL CALC-MCNC: 102 MG/DL (ref 0–99)
LDLC/HDLC SERPL: 2.5 RATIO (ref 0–3.6)
LIPASE SERPL-CCNC: 20 U/L (ref 13–78)
LYMPHOCYTES # BLD AUTO: 1.7 X10E3/UL (ref 0.7–3.1)
LYMPHOCYTES NFR BLD AUTO: 27 %
MCH RBC QN AUTO: 29.2 PG (ref 26.6–33)
MCHC RBC AUTO-ENTMCNC: 33.7 G/DL (ref 31.5–35.7)
MCV RBC AUTO: 87 FL (ref 79–97)
MONOCYTES # BLD AUTO: 0.3 X10E3/UL (ref 0.1–0.9)
MONOCYTES NFR BLD AUTO: 6 %
NEUTROPHILS # BLD AUTO: 3.8 X10E3/UL (ref 1.4–7)
NEUTROPHILS NFR BLD AUTO: 61 %
PLATELET # BLD AUTO: 225 X10E3/UL (ref 150–450)
POTASSIUM SERPL-SCNC: 4.5 MMOL/L (ref 3.5–5.2)
PROT SERPL-MCNC: 7 G/DL (ref 6–8.5)
RBC # BLD AUTO: 5.24 X10E6/UL (ref 4.14–5.8)
SL AMB VLDL CHOLESTEROL CALC: 17 MG/DL (ref 5–40)
SODIUM SERPL-SCNC: 141 MMOL/L (ref 134–144)
TRIGL SERPL-MCNC: 93 MG/DL (ref 0–149)
WBC # BLD AUTO: 6.1 X10E3/UL (ref 3.4–10.8)

## 2024-02-22 ENCOUNTER — OFFICE VISIT (OUTPATIENT)
Dept: INTERNAL MEDICINE CLINIC | Facility: CLINIC | Age: 49
End: 2024-02-22
Payer: COMMERCIAL

## 2024-02-22 VITALS
HEIGHT: 71 IN | DIASTOLIC BLOOD PRESSURE: 72 MMHG | WEIGHT: 267 LBS | BODY MASS INDEX: 37.38 KG/M2 | OXYGEN SATURATION: 98 % | TEMPERATURE: 98 F | HEART RATE: 84 BPM | SYSTOLIC BLOOD PRESSURE: 118 MMHG

## 2024-02-22 DIAGNOSIS — H00.036 EYELID CELLULITIS, LEFT: ICD-10-CM

## 2024-02-22 DIAGNOSIS — H00.014 HORDEOLUM EXTERNUM OF LEFT UPPER EYELID: Primary | ICD-10-CM

## 2024-02-22 PROCEDURE — 99213 OFFICE O/P EST LOW 20 MIN: CPT | Performed by: INTERNAL MEDICINE

## 2024-02-22 RX ORDER — ERYTHROMYCIN 5 MG/G
0.5 OINTMENT OPHTHALMIC
Qty: 7 G | Refills: 0 | Status: SHIPPED | OUTPATIENT
Start: 2024-02-22 | End: 2024-02-29

## 2024-02-22 RX ORDER — CEPHALEXIN 500 MG/1
500 CAPSULE ORAL 2 TIMES DAILY
Qty: 14 CAPSULE | Refills: 0 | Status: SHIPPED | OUTPATIENT
Start: 2024-02-22 | End: 2024-02-29

## 2024-02-22 NOTE — PATIENT INSTRUCTIONS
Erythromycin ointment  [facility] antibiotic  Ophthalmology    Stye   WHAT YOU NEED TO KNOW:   A stye is a lump on the edge or inside of your eyelid caused by an infection. A stye can form on your upper or lower eyelid. It usually goes away in 2 to 4 days.  DISCHARGE INSTRUCTIONS:   Medicines:   Antibiotic medicine:  This is given as an ointment to put into your eye. It is used to fight an infection caused by bacteria. Use as directed.     Take your medicine as directed.  Contact your healthcare provider if you think your medicine is not helping or if you have side effects. Tell him or her if you are allergic to any medicine. Keep a list of the medicines, vitamins, and herbs you take. Include the amounts, and when and why you take them. Bring the list or the pill bottles to follow-up visits. Carry your medicine list with you in case of an emergency.    Follow up with your doctor as directed:  Write down your questions so you remember to ask them during your visits.   Self-care:   Use warm compresses:  This will help decrease swelling and pain. Wet a clean washcloth with warm water and place it on your eye for 10 to 15 minutes, 3 to 4 times each day or as directed.    Keep your hands away from your eye:  This helps to prevent the spread of infection to other parts of the eye. Wash your hands often with soap and dry with a clean towel. Do not squeeze the stye.     Do not use eye makeup:  Do not wear eye makeup while you have a stye. Eye makeup may carry bacteria and cause another stye. Throw away eye makeup and brushes used to apply the makeup. Use new eye makeup after the stye has gone away. Do not share eye makeup with others.    Prevent another stye:  Wash your face and clean your eyelashes every day. Remove eye makeup with makeup remover. This helps to completely remove eye makeup without heavy rubbing.    Contact your healthcare provider if:   You have redness and discharge around your eye, and your eye pain is  getting worse.    Your vision changes.    The stye has not gone away within 7 days.     The stye comes back within a short period of time after treatment.    You have questions or concerns about your condition or care.    © Copyright [employer] 2022 Information is for End User's use only and may not be sold, redistributed or otherwise used for commercial purposes. All illustrations and images included in CareNotes® are the copyrighted property of [employer], [employer] or [employer]  The above information is an  only. It is not intended as medical advice for individual conditions or treatments. Talk to your doctor, nurse or pharmacist before following any medical regimen to see if it is safe and effective for you.

## 2024-02-22 NOTE — PROGRESS NOTES
Name: Myron Jo      : 1975      MRN: [id number]  Encounter Provider: Bello Rojas DO  Encounter Date: 2024   Encounter department: [facility] INTERNAL MEDICINE    Assessment & Plan     1. Hordeolum externum of left upper eyelid  Comments:  erythromycin ointment ordered, d/w patinet proper use of rx.  keflex ordered for concomitant eyelid cellulitis.  opthal eval advised  Orders:  -     erythromycin (ILOTYCIN) ophthalmic ointment; Administer 0.5 inches into the left eye daily at bedtime for 7 days  -     cephalexin (KEFLEX) 500 mg capsule; Take 1 capsule (500 mg total) by mouth 2 (two) times a day for 7 days  -     Ambulatory Referral to Ophthalmology; Future    2. Eyelid cellulitis, left  Comments:  c/w plan as above.  Orders:  -     cephalexin (KEFLEX) 500 mg capsule; Take 1 capsule (500 mg total) by mouth 2 (two) times a day for 7 days  -     Ambulatory Referral to Ophthalmology; Future        Depression Screening and Follow-up Plan: Patient was screened for depression during today's encounter. They screened negative with a PHQ-2 score of 0.        Subjective      HPI    Here with c/o left eyelid swelling and discomfort over the last 1 week.  No eye pain, vision changes except from eyelid swelling.  No fever, chills and he has had stye of eyelids in the past.  He is using warm compresses with limited benefit.  He does not wear contacts but glasses for reading only.  He has no other questions or concerns today and ROS is otherwise negative    Review of Systems   Constitutional:  Negative for fever.   Eyes:  Negative for redness and visual disturbance.   Allergic/Immunologic: Negative for immunocompromised state.   Psychiatric/Behavioral:  Negative for dysphoric mood.        No current outpatient medications on file prior to visit.       Objective     /72 (BP Location: Left arm, Patient Position: Sitting, Cuff Size: Standard)   Pulse 84   Temp 98 °F (36.7 °C)   Ht 5'  "11\" (1.803 m)   Wt 121 kg (267 lb)   SpO2 98%   BMI 37.24 kg/m²     Physical Exam  Vitals reviewed.   Constitutional:       General: He is not in acute distress.  HENT:      Head: Normocephalic and atraumatic.   Eyes:      General:         Left eye: Hordeolum (upper left eyelid with signs of eyelid cellulitis) present.     Conjunctiva/sclera: Conjunctivae normal.   Cardiovascular:      Rate and Rhythm: Normal rate and regular rhythm.      Heart sounds: No murmur heard.  Pulmonary:      Effort: Pulmonary effort is normal.      Breath sounds: No wheezing or rales.   Neurological:      Mental Status: He is alert.   Psychiatric:         Mood and Affect: Mood normal.         Behavior: Behavior normal.       Bello Rojas,     "

## 2024-05-17 ENCOUNTER — TELEPHONE (OUTPATIENT)
Age: 49
End: 2024-05-17

## 2024-05-17 NOTE — TELEPHONE ENCOUNTER
Patient is requesting an insurance referral for the following specialty:      Test Name / Order Name: evaluation      DX Code: H00.014    Date Of Service: 2/27/2024 (backdating)    Location/Facility Name/Address/Phone #: Dr. Jorge Vanessa-[address] [address]  [phone]  [phone]    Location / Facility NPI: [id number]    [facility] Phone # To Reach The Patient:   [phone]

## 2025-08-22 ENCOUNTER — TELEPHONE (OUTPATIENT)
Dept: FAMILY MEDICINE CLINIC | Facility: CLINIC | Age: 50
End: 2025-08-22